# Patient Record
Sex: FEMALE | Race: WHITE | URBAN - METROPOLITAN AREA
[De-identification: names, ages, dates, MRNs, and addresses within clinical notes are randomized per-mention and may not be internally consistent; named-entity substitution may affect disease eponyms.]

---

## 2022-04-11 PROBLEM — N81.89 WEAKNESS OF PELVIC FLOOR: Status: ACTIVE | Noted: 2022-04-07

## 2022-04-11 PROBLEM — N30.10 INTERSTITIAL CYSTITIS: Status: ACTIVE | Noted: 2022-04-07

## 2022-06-06 ENCOUNTER — OFFICE VISIT (OUTPATIENT)
Dept: UROGYNECOLOGY | Age: 72
End: 2022-06-06
Payer: MEDICARE

## 2022-06-06 DIAGNOSIS — N30.10 INTERSTITIAL CYSTITIS: Primary | ICD-10-CM

## 2022-06-06 PROCEDURE — 1123F ACP DISCUSS/DSCN MKR DOCD: CPT | Performed by: OBSTETRICS & GYNECOLOGY

## 2022-06-06 PROCEDURE — G8400 PT W/DXA NO RESULTS DOC: HCPCS | Performed by: OBSTETRICS & GYNECOLOGY

## 2022-06-06 PROCEDURE — G8428 CUR MEDS NOT DOCUMENT: HCPCS | Performed by: OBSTETRICS & GYNECOLOGY

## 2022-06-06 PROCEDURE — 99214 OFFICE O/P EST MOD 30 MIN: CPT | Performed by: OBSTETRICS & GYNECOLOGY

## 2022-06-06 PROCEDURE — 4004F PT TOBACCO SCREEN RCVD TLK: CPT | Performed by: OBSTETRICS & GYNECOLOGY

## 2022-06-06 PROCEDURE — 3017F COLORECTAL CA SCREEN DOC REV: CPT | Performed by: OBSTETRICS & GYNECOLOGY

## 2022-06-06 PROCEDURE — G8419 CALC BMI OUT NRM PARAM NOF/U: HCPCS | Performed by: OBSTETRICS & GYNECOLOGY

## 2022-06-06 PROCEDURE — 1090F PRES/ABSN URINE INCON ASSESS: CPT | Performed by: OBSTETRICS & GYNECOLOGY

## 2022-06-06 RX ORDER — METHENAMINE, SODIUM PHOSPHATE, MONOBASIC, MONOHYDRATE, PHENYL SALICYLATE, METHYLENE BLUE, AND HYOSCYAMINE SULFATE 120; 40.8; 36; 10; .12 MG/1; MG/1; MG/1; MG/1; MG/1
1 CAPSULE ORAL 2 TIMES DAILY PRN
Qty: 60 CAPSULE | Refills: 11 | Status: SHIPPED | OUTPATIENT
Start: 2022-06-06

## 2022-06-06 NOTE — ASSESSMENT & PLAN NOTE
Cont with uribel, prelief, marshmallow root and bladder ease. Cont PT with Sallyann Locks. Avoid bladder irritants. May increase uribel to 2 times per day as needed. Change lasix to 4-5pm.     If changing lasix does not work, I recommend that she call pulm to adjust CPAP. rx for lidocaine cream to compunding pharmacy.

## 2022-06-06 NOTE — PATIENT INSTRUCTIONS
Cont with uribel, prelief, marshmallow root and bladder ease. Cont PT with Katie Hoops. Avoid bladder irritants. May increase uribel to 2 times per day as needed. Change lasix to 4-5pm.     If changing lasix does not work, I recommend that she call pulm to adjust CPAP.

## 2022-06-06 NOTE — PROGRESS NOTES
[facility] UROGYNECOLOGY  [address]  Dept: [phone]    PCP:  Darell Varela MD    6/6/2022    HPI:  Albertina Hansen is here to follow up on Follow-up (IC)    Patient is doing well today. She has been going to PT, Kayla Gonzalez) and finds this to be helpful. She is taking bladder ease, (twice a day), prelief (prn), marshmallow root (twice a day), and uribel (1-2 times a day), and following the bladder diet. She is finding the medications and dietary modifications to be helpful with her symptoms. She continues to have frequency, but states it is more at night, (8-10 during the day and 3 times at night). Her urinary/ bladder pain has been reduced significantly. She has made a lot of dietary and behavior modification. The only bothersome thing is the night time voiding. She is wearing her CPAP but it has not been check since last August.     She has cut off her drinking at 6pm and goes to bed at 10-12pm.     She is taking lasix 20mg in the morning. No results found for this visit on 06/06/22. There were no vitals taken for this visit. 1. Interstitial cystitis  Assessment & Plan:  Cont with uribel, prelief, marshmallow root and bladder ease. Cont PT with Kayla Gonzalez. Avoid bladder irritants. May increase uribel to 2 times per day as needed. Change lasix to 4-5pm.     If changing lasix does not work, I recommend that she call [facility] to adjust CPAP. rx for lidocaine cream to compunding pharmacy. Return in about 2 months (around 8/6/2022) for IC check. On this date 6/6/2022 I have spent 30 minutes reviewing previous notes, test results and face to face with the patient discussing the diagnosis and importance of compliance with the treatment plan as well as documenting on the day of the visit.       Cathie Marmolejo, DO

## 2022-11-07 ENCOUNTER — OFFICE VISIT (OUTPATIENT)
Dept: UROGYNECOLOGY | Age: 72
End: 2022-11-07
Payer: MEDICARE

## 2022-11-07 DIAGNOSIS — N30.10 INTERSTITIAL CYSTITIS: ICD-10-CM

## 2022-11-07 PROCEDURE — 4004F PT TOBACCO SCREEN RCVD TLK: CPT | Performed by: OBSTETRICS & GYNECOLOGY

## 2022-11-07 PROCEDURE — 3017F COLORECTAL CA SCREEN DOC REV: CPT | Performed by: OBSTETRICS & GYNECOLOGY

## 2022-11-07 PROCEDURE — 1090F PRES/ABSN URINE INCON ASSESS: CPT | Performed by: OBSTETRICS & GYNECOLOGY

## 2022-11-07 PROCEDURE — G8484 FLU IMMUNIZE NO ADMIN: HCPCS | Performed by: OBSTETRICS & GYNECOLOGY

## 2022-11-07 PROCEDURE — 99213 OFFICE O/P EST LOW 20 MIN: CPT | Performed by: OBSTETRICS & GYNECOLOGY

## 2022-11-07 PROCEDURE — G8400 PT W/DXA NO RESULTS DOC: HCPCS | Performed by: OBSTETRICS & GYNECOLOGY

## 2022-11-07 PROCEDURE — G8417 CALC BMI ABV UP PARAM F/U: HCPCS | Performed by: OBSTETRICS & GYNECOLOGY

## 2022-11-07 PROCEDURE — 1123F ACP DISCUSS/DSCN MKR DOCD: CPT | Performed by: OBSTETRICS & GYNECOLOGY

## 2022-11-07 PROCEDURE — G8427 DOCREV CUR MEDS BY ELIG CLIN: HCPCS | Performed by: OBSTETRICS & GYNECOLOGY

## 2022-11-07 NOTE — PROGRESS NOTES
[facility] UROGYNECOLOGY  [address]  Dept: [phone]    PCP:  Cosette Saint, MD    11/7/2022      HPI:  Sang De is here to follow up on Follow-up (IC)    Patient is doing very well today. She continues to follow the bladder diet, taking uribel (twice, daily), marshmallow, (twice, daily), bladder ease, (twice, daily), prelief, (prn) and is finding all of this to be very helpful. She said her urinary frequency has improved since she is now taking her lasix in the afternoon. She is still having to urinate at night, but some nights not at all, 0-1 times. She does have a hx of kidney stones and this irritates her bladder significantly. She has passed them on her own. She has passed 3 since I last saw her. No results found for this visit on 11/07/22. There were no vitals taken for this visit. 1. Interstitial cystitis  Assessment & Plan:  Cont with uribel, prelief, bladder and marshmallow root. She is keeping away from bladder irritants. Her irritants are red sauce, caffeine etc.      No follow-ups on file. On this date 11/7/2022 I have spent 20 minutes reviewing previous notes, test results and face to face with the patient discussing the diagnosis and importance of compliance with the treatment plan as well as documenting on the day of the visit.                   Kalpesh Denny DO

## 2022-11-07 NOTE — ASSESSMENT & PLAN NOTE
Cont with uribel, prelief, bladder and marshmallow root. She is keeping away from bladder irritants.  Her irritants are red sauce, caffeine etc.